# Patient Record
Sex: FEMALE | Race: BLACK OR AFRICAN AMERICAN | Employment: FULL TIME | URBAN - METROPOLITAN AREA
[De-identification: names, ages, dates, MRNs, and addresses within clinical notes are randomized per-mention and may not be internally consistent; named-entity substitution may affect disease eponyms.]

---

## 2018-04-06 ENCOUNTER — MED REC SCAN ONLY (OUTPATIENT)
Dept: FAMILY MEDICINE CLINIC | Facility: CLINIC | Age: 58
End: 2018-04-06

## 2018-05-13 ENCOUNTER — PATIENT OUTREACH (OUTPATIENT)
Dept: FAMILY MEDICINE CLINIC | Facility: CLINIC | Age: 58
End: 2018-05-13

## 2018-05-13 NOTE — PROGRESS NOTES
Pt has dx of HTN. Pt's last office visit 2016- former Dr Desire Richard pt. Pt's PCP listed as Dr Princess Suazo. Please find out if Dr Princess Suazo is pt's PCP- if so, she is due for HTN follow up with Dr Princess Suazo. If she has another PCP, please let Ervin Uriostegui know.

## 2018-06-03 ENCOUNTER — PATIENT OUTREACH (OUTPATIENT)
Dept: FAMILY MEDICINE CLINIC | Facility: CLINIC | Age: 58
End: 2018-06-03

## 2018-06-03 NOTE — PROGRESS NOTES
Please call pt to inquire if pt has had a colonoscopy in the last 10 years and if so who performed it (name and phone #). Please let Joseny Molly know so I can obtain the report.     If pt did not have a colonoscopy please advise them we will leave the FIT stoo

## 2018-07-05 ENCOUNTER — OFFICE VISIT (OUTPATIENT)
Dept: FAMILY MEDICINE CLINIC | Facility: CLINIC | Age: 58
End: 2018-07-05

## 2018-07-05 VITALS
TEMPERATURE: 99 F | BODY MASS INDEX: 37.73 KG/M2 | OXYGEN SATURATION: 99 % | RESPIRATION RATE: 18 BRPM | DIASTOLIC BLOOD PRESSURE: 80 MMHG | WEIGHT: 205 LBS | HEIGHT: 62 IN | HEART RATE: 104 BPM | SYSTOLIC BLOOD PRESSURE: 136 MMHG

## 2018-07-05 DIAGNOSIS — A08.4 VIRAL GASTROENTERITIS: ICD-10-CM

## 2018-07-05 DIAGNOSIS — J01.01 ACUTE RECURRENT MAXILLARY SINUSITIS: Primary | ICD-10-CM

## 2018-07-05 PROCEDURE — 99214 OFFICE O/P EST MOD 30 MIN: CPT | Performed by: FAMILY MEDICINE

## 2018-07-05 RX ORDER — AMOXICILLIN AND CLAVULANATE POTASSIUM 875; 125 MG/1; MG/1
1 TABLET, FILM COATED ORAL 2 TIMES DAILY
Qty: 20 TABLET | Refills: 0 | Status: SHIPPED | OUTPATIENT
Start: 2018-07-05 | End: 2018-07-15

## 2018-07-05 NOTE — PROGRESS NOTES
HPI:   Glade Nageotte is a 59year old female who presents for upper respiratory symptoms for  2  weeks. Patient reports congestion, yellow colored nasal discharge, sinus pain, OTC cold meds have not been helping, prior history of sinusitis, denies fever. pain  NEURO: + headaches    EXAM:   /80   Pulse 104   Temp 98.6 °F (37 °C) (Oral)   Resp 18   Ht 62\"   Wt 205 lb   LMP 11/01/2015 (Approximate)   SpO2 99%   BMI 37.49 kg/m²   GENERAL: well developed, well nourished,in no apparent distress  SKIN: no

## 2018-07-06 ENCOUNTER — TELEPHONE (OUTPATIENT)
Dept: FAMILY MEDICINE CLINIC | Facility: CLINIC | Age: 58
End: 2018-07-06

## 2018-07-06 NOTE — TELEPHONE ENCOUNTER
Pt was seen yesterday with stomach flu. She would like a note for work stating she can return Monday. Please advise. Thank you.

## 2019-01-10 ENCOUNTER — OFFICE VISIT (OUTPATIENT)
Dept: FAMILY MEDICINE CLINIC | Facility: CLINIC | Age: 59
End: 2019-01-10
Payer: COMMERCIAL

## 2019-01-10 VITALS
TEMPERATURE: 98 F | BODY MASS INDEX: 39.75 KG/M2 | HEIGHT: 62 IN | HEART RATE: 100 BPM | DIASTOLIC BLOOD PRESSURE: 80 MMHG | WEIGHT: 216 LBS | SYSTOLIC BLOOD PRESSURE: 130 MMHG | RESPIRATION RATE: 14 BRPM

## 2019-01-10 DIAGNOSIS — R60.9 PERIPHERAL EDEMA: ICD-10-CM

## 2019-01-10 DIAGNOSIS — D36.7 DERMOID CYST OF LEG, LEFT: Primary | ICD-10-CM

## 2019-01-10 PROCEDURE — 99213 OFFICE O/P EST LOW 20 MIN: CPT | Performed by: FAMILY MEDICINE

## 2019-01-10 NOTE — PROGRESS NOTES
[facility] Office Note  Chief Complaint:   Patient presents with:  Lump: lump lower outer left leg      HPI:   This is a 59year old female coming in for lump on left leg. States lump has been present for many months.   It hasn't joint pain or stiffness.     EXAM:   /80 (BP Location: Left arm, Patient Position: Sitting, Cuff Size: adult)   Pulse 100   Temp 97.7 °F (36.5 °C) (Oral)   Resp 14   Ht 62\"   Wt 216 lb   LMP 11/01/2015 (Approximate)   BMI 39.51 kg/m²  Estimated body worsening or changing symptoms. Patient is to call with any side effects or complications from the treatments as a result of today.      Problem List:  Patient Active Problem List:     Hypertension     Bell's palsy      MIRIAN BEGUM,   1/10/2019  5

## 2019-01-27 ENCOUNTER — PATIENT OUTREACH (OUTPATIENT)
Dept: FAMILY MEDICINE CLINIC | Facility: CLINIC | Age: 59
End: 2019-01-27

## 2019-01-27 DIAGNOSIS — Z12.39 SCREENING FOR BREAST CANCER: Primary | ICD-10-CM

## 2019-01-27 NOTE — PROGRESS NOTES
Please call pt to advise them they are due for their yearly mammogram.  An order has been placed in [employer]. Patient can call central scheduling at [phone] to schedule this appt.      If patient has had this performed at another location please ask them

## 2019-01-29 NOTE — PROGRESS NOTES
LVM to an unidentified VM to schedule her yearly mammogram and to also schedule her yearly physical with Dr Sara Martinez.

## 2019-03-07 NOTE — PROGRESS NOTES
Please call pt to inquire if pt has had a colonoscopy in the last 10 years and if so who performed it (name and phone #). Please let Rosa Felder know so I can obtain the report.     If pt did not have a colonoscopy please advise them we will leave the FIT stoo

## 2019-03-13 NOTE — PROGRESS NOTES
Pt called back and stated that she has had a colonoscopy done about 8 years ago. Pt stated she had it done in [place]. She does not remember the doctor's name who had performed it.      Pt states she will give us a call back once she looks into who had

## 2019-05-07 NOTE — PROGRESS NOTES
Noted below. Please call patient and find out if she recalls provider who did her colonoscopy so we can reach out to them for a report to determine her colonoscopy recall date and update her record here.  Thanks

## 2019-05-07 NOTE — PROGRESS NOTES
Please call pt to advise them they are due for their yearly mammogram.  An order has been placed in [employer]. Patient can call central scheduling at [phone] to schedule this appt.      If patient has had this performed at another location please ask them

## 2019-06-14 ENCOUNTER — TELEPHONE (OUTPATIENT)
Dept: FAMILY MEDICINE CLINIC | Facility: CLINIC | Age: 59
End: 2019-06-14

## 2019-06-14 NOTE — TELEPHONE ENCOUNTER
Left a detailed message for the pt to call the office to schedule a physical.    Also she was given the number to Central Scheduling to call and schedule her Mammogram.   The order has been placed as of January,

## 2019-08-16 ENCOUNTER — PATIENT OUTREACH (OUTPATIENT)
Dept: CASE MANAGEMENT | Age: 59
End: 2019-08-16

## 2021-04-09 ENCOUNTER — IMMUNIZATION (OUTPATIENT)
Dept: LAB | Age: 61
End: 2021-04-09
Attending: HOSPITALIST
Payer: COMMERCIAL

## 2021-04-09 DIAGNOSIS — Z23 NEED FOR VACCINATION: Primary | ICD-10-CM

## 2021-04-09 PROCEDURE — 0001A SARSCOV2 VAC 30MCG/0.3ML IM: CPT

## 2021-04-30 ENCOUNTER — IMMUNIZATION (OUTPATIENT)
Dept: LAB | Age: 61
End: 2021-04-30
Attending: HOSPITALIST
Payer: COMMERCIAL

## 2021-04-30 DIAGNOSIS — Z23 NEED FOR VACCINATION: Primary | ICD-10-CM

## 2021-04-30 PROCEDURE — 0002A SARSCOV2 VAC 30MCG/0.3ML IM: CPT

## (undated) NOTE — LETTER
Date: 7/6/2018    Patient Name: Colleen Almaguer          To Whom it may concern: This letter has been written at the patient's request. The above patient was seen at the [facility] for treatment of a medical condition.     This patient should